# Patient Record
Sex: FEMALE | Race: WHITE
[De-identification: names, ages, dates, MRNs, and addresses within clinical notes are randomized per-mention and may not be internally consistent; named-entity substitution may affect disease eponyms.]

---

## 2017-02-06 ENCOUNTER — HOSPITAL ENCOUNTER (OUTPATIENT)
Dept: HOSPITAL 45 - C.PAPS | Age: 27
Discharge: HOME | End: 2017-02-06
Attending: OBSTETRICS & GYNECOLOGY
Payer: COMMERCIAL

## 2017-02-06 DIAGNOSIS — Z01.419: Primary | ICD-10-CM

## 2017-03-09 ENCOUNTER — HOSPITAL ENCOUNTER (OUTPATIENT)
Dept: HOSPITAL 45 - C.FOODA | Age: 27
Discharge: HOME | End: 2017-03-09
Attending: INTERNAL MEDICINE
Payer: COMMERCIAL

## 2017-03-09 DIAGNOSIS — R19.4: Primary | ICD-10-CM

## 2017-03-09 DIAGNOSIS — R63.4: ICD-10-CM

## 2017-03-09 DIAGNOSIS — K90.49: ICD-10-CM

## 2017-06-19 ENCOUNTER — HOSPITAL ENCOUNTER (OUTPATIENT)
Dept: HOSPITAL 45 - C.GI | Age: 27
Discharge: HOME | End: 2017-06-19
Attending: INTERNAL MEDICINE
Payer: COMMERCIAL

## 2017-06-19 VITALS — OXYGEN SATURATION: 100 % | SYSTOLIC BLOOD PRESSURE: 96 MMHG | HEART RATE: 60 BPM | DIASTOLIC BLOOD PRESSURE: 62 MMHG

## 2017-06-19 VITALS
BODY MASS INDEX: 18.92 KG/M2 | HEIGHT: 60.98 IN | HEIGHT: 60.98 IN | WEIGHT: 100.2 LBS | BODY MASS INDEX: 18.92 KG/M2 | WEIGHT: 100.2 LBS

## 2017-06-19 VITALS — TEMPERATURE: 97.34 F

## 2017-06-19 DIAGNOSIS — K31.89: ICD-10-CM

## 2017-06-19 DIAGNOSIS — R63.4: Primary | ICD-10-CM

## 2017-06-19 DIAGNOSIS — K29.50: ICD-10-CM

## 2017-06-19 NOTE — ANESTHESIOLOGY PROGRESS NOTE
Anesthesia Post Op Note


Date & Time


Jun 19, 2017 at 10:42





Vital Signs


Pain Intensity:  0





Vital Signs Past 12 Hours








  Date Time  Temp Pulse Resp B/P (MAP) Pulse Ox O2 Delivery O2 Flow Rate FiO2


 


6/19/17 10:33  76 16 99/60 (73) 97 Room Air  


 


6/19/17 09:38 36.3 74 18 103/65 (78) 10 Room Air  











Notes


Mental Status:  alert / awake / arousable, participated in evaluation


Pt Amnestic to Procedure:  Yes


Nausea / Vomiting:  adequately controlled


Pain:  adequately controlled


Airway Patency, RR, SpO2:  stable & adequate


BP & HR:  stable & adequate


Hydration State:  stable & adequate


Anesthetic Complications:  no major complications apparent

## 2017-06-19 NOTE — GI REPORT
Procedure Date: 6/19/2017 10:07 AM

Procedure:            Upper GI endoscopy

Indications:          Weight loss

Medicines:            Monitored Anesthesia Care

Complications:        No immediate complications.

Estimated Blood Loss: Estimated blood loss: none.

Procedure:            Pre-Anesthesia Assessment:

                      - Prior to the procedure, a History and Physical was 

                      performed, and patient medications and allergies were 

                      reviewed. The patient's tolerance of previous 

                      anesthesia was also reviewed. The risks and benefits of 

                      the procedure and the sedation options and risks were 

                      discussed with the patient. All questions were 

                      answered, and informed consent was obtained. Prior 

                      Anticoagulants: The patient has taken no previous 

                      anticoagulant or antiplatelet agents. ASA Grade 

                      Assessment: II - A patient with mild systemic disease. 

                      After reviewing the risks and benefits, the patient was 

                      deemed in satisfactory condition to undergo the 

                      procedure.

                      After obtaining informed consent, the endoscope was 

                      passed under direct vision. Throughout the procedure, 

                      the patient's blood pressure, pulse, and oxygen 

                      saturations were monitored continuously. The scope was 

                      introduced through the mouth, and advanced to the 

                      second part of duodenum. The upper GI endoscopy was 

                      accomplished without difficulty. The patient tolerated 

                      the procedure well.

Findings:

     The Z-line was irregular. Biopsies were taken with a cold forceps for 

     histology.

     The entire examined stomach was normal. Biopsies were taken with a cold 

     forceps for Helicobacter pylori testing.

     The examined duodenum was normal. Biopsies for histology were taken with 

     a cold forceps for evaluation of celiac disease.

Impression:           - Z-line irregular. Biopsied.

                      - Normal stomach. Biopsied.

                      - Normal examined duodenum. Biopsied.

Recommendation:       - Resume previous diet.

                      - Continue present medications.

                      - Await pathology results.

                      - Return to GI clinic as previously scheduled.

Dario Mayberry, DO

6/19/2017 10:49:19 AM

This report has been signed electronically.

Note Initiated On: 6/19/2017 10:07 AM

     I attest to the content of the Intraoperative Record and orders 

     documented therein, exceptions below

## 2017-06-19 NOTE — ENDO HISTORY AND PHYSICAL
History & Physical


Date of Service:


Jun 19, 2017.


Chief Complaint:


weight loss


Referring Physician:


Elana Benton PA-C


History of Present Illness


25 yo CF who presents for EGD secondary to weight loss.





Past Surgical History


Hx Cardiac Surgery:  No


Hx Internal Defibrillator:  No


Hx Pacemaker:  No


Hx Abdominal Surgery:  No


Hx of Implantable Prosthesis:  No


Hx Post-Op Nausea and Vomiting:  No


Hx Cancer Surgery:  No


Hx Thoracic Surgery:  No


Hx Orthopedic:  No


Hx Urinary Tract Surgery:  No





Family History


None





Social History


Smoking Status:  Never Smoker


Hx Substance Use:  No


Hx Alcohol Use:  Yes ([facility])





Allergies


Coded Allergies:  


     No Known Allergies (Verified , 6/19/17)





Current Medications





Reported Home Medications








 Medications  Dose


 Route/Sig


 Max Daily Dose Days Date Category


 


 Probiotic


  (Probiotic


 Product) 1 Cap Cap  1 Cap


 PO QAM


    6/2/17 Reported


 


 Omega-3 (Fish


 Oil) 1 Ea Cap  1 Cap


 PO QAM


    6/2/17 Reported


 


 Multivitamin


  (Multivitamins)


 Tab  1 Tab


 PO QAM


    5/24/16 Reported


 


 Birth Control


 Pills


  (Miscellaneous)


 Tab  1 Tab


 PO QAM


    5/24/16 Reported











Vital Signs


Weight (Kilograms):  45.45


Height (Feet):  5


Height (Inches):  1











  Date Time  Temp Pulse Resp B/P (MAP) Pulse Ox O2 Delivery O2 Flow Rate FiO2


 


6/19/17 09:38 36.3 74 18 103/65 (78) 10 Room Air  











Physical Exam


General Appearance:  WD/WN, no apparent distress


Respiratory/Chest:  


   Auscultation:  breath sounds normal


Cardiovascular:  


   Heart Auscultation:  RRR


Abdomen:  


   Bowel Sounds:  normal


   Inspection & Palpation:  soft, non-distended, no tenderness, guarding & 

rebound





Assessment and Plan


Assessment:


25 yo CF who presents for EGD secondary to weight loss.








Plan:


Proceed with EGD.

## 2017-07-27 ENCOUNTER — HOSPITAL ENCOUNTER (OUTPATIENT)
Dept: HOSPITAL 45 - C.FOODA | Age: 27
Discharge: HOME | End: 2017-07-27
Attending: PHYSICIAN ASSISTANT
Payer: COMMERCIAL

## 2017-07-27 DIAGNOSIS — K58.9: Primary | ICD-10-CM

## 2017-07-27 DIAGNOSIS — K21.9: ICD-10-CM

## 2017-07-27 DIAGNOSIS — R63.4: ICD-10-CM

## 2017-08-02 ENCOUNTER — HOSPITAL ENCOUNTER (OUTPATIENT)
Dept: HOSPITAL 45 - C.LAB1850 | Age: 27
Discharge: HOME | End: 2017-08-02
Attending: PHYSICIAN ASSISTANT
Payer: COMMERCIAL

## 2017-08-02 DIAGNOSIS — R19.4: Primary | ICD-10-CM

## 2017-08-02 LAB — TSH SERPL-ACNC: 1.94 UIU/ML (ref 0.3–4.5)

## 2018-01-17 ENCOUNTER — HOSPITAL ENCOUNTER (OUTPATIENT)
Dept: HOSPITAL 45 - C.RADBC | Age: 28
Discharge: HOME | End: 2018-01-17
Attending: PHYSICIAN ASSISTANT
Payer: COMMERCIAL

## 2018-01-17 DIAGNOSIS — R07.9: Primary | ICD-10-CM

## 2018-01-17 NOTE — DIAGNOSTIC IMAGING REPORT
CHEST 2 VIEWS ROUTINE



CLINICAL HISTORY: Atypical chest pain    



COMPARISON STUDY:  No previous studies for comparison.



FINDINGS: The cardiac and mediastinal contours are normal. There is no evidence

of focal pulmonary consolidation. There is no evidence of failure. No pleural

effusions are visualized.[ No pneumothorax is visualized.



IMPRESSION: No active disease in the chest.







Electronically signed by:  Renzo Zimmer M.D.

1/17/2018 2:19 PM



Dictated Date/Time:  1/17/2018 2:19 PM

## 2018-02-08 ENCOUNTER — HOSPITAL ENCOUNTER (OUTPATIENT)
Dept: HOSPITAL 45 - C.PAPS | Age: 28
Discharge: HOME | End: 2018-02-08
Attending: PHYSICIAN ASSISTANT
Payer: COMMERCIAL

## 2018-02-08 DIAGNOSIS — Z01.419: Primary | ICD-10-CM
